# Patient Record
Sex: FEMALE
[De-identification: names, ages, dates, MRNs, and addresses within clinical notes are randomized per-mention and may not be internally consistent; named-entity substitution may affect disease eponyms.]

---

## 2019-08-15 ENCOUNTER — HOSPITAL ENCOUNTER (OUTPATIENT)
Dept: HOSPITAL 5 - TRG | Age: 29
LOS: 1 days | Discharge: HOME | End: 2019-08-16
Attending: OBSTETRICS & GYNECOLOGY
Payer: COMMERCIAL

## 2019-08-15 DIAGNOSIS — Z3A.39: ICD-10-CM

## 2019-08-15 DIAGNOSIS — O47.1: Primary | ICD-10-CM

## 2019-08-15 PROCEDURE — 59025 FETAL NON-STRESS TEST: CPT

## 2019-08-16 VITALS — SYSTOLIC BLOOD PRESSURE: 107 MMHG | DIASTOLIC BLOOD PRESSURE: 69 MMHG

## 2019-08-17 ENCOUNTER — HOSPITAL ENCOUNTER (INPATIENT)
Dept: HOSPITAL 5 - LD | Age: 29
LOS: 4 days | Discharge: HOME | End: 2019-08-21
Attending: OBSTETRICS & GYNECOLOGY | Admitting: OBSTETRICS & GYNECOLOGY
Payer: COMMERCIAL

## 2019-08-17 DIAGNOSIS — Z3A.39: ICD-10-CM

## 2019-08-17 DIAGNOSIS — D50.9: ICD-10-CM

## 2019-08-17 DIAGNOSIS — D62: ICD-10-CM

## 2019-08-17 LAB
HCT VFR BLD CALC: 33.4 % (ref 30.3–42.9)
HGB BLD-MCNC: 10.6 GM/DL (ref 10.1–14.3)
MCHC RBC AUTO-ENTMCNC: 32 % (ref 30–34)
MCV RBC AUTO: 72 FL (ref 79–97)
PLATELET # BLD: 274 K/MM3 (ref 140–440)
RBC # BLD AUTO: 4.65 M/MM3 (ref 3.65–5.03)

## 2019-08-17 PROCEDURE — 0HQ9XZZ REPAIR PERINEUM SKIN, EXTERNAL APPROACH: ICD-10-PCS | Performed by: OBSTETRICS & GYNECOLOGY

## 2019-08-17 PROCEDURE — 36415 COLL VENOUS BLD VENIPUNCTURE: CPT

## 2019-08-17 PROCEDURE — 86900 BLOOD TYPING SEROLOGIC ABO: CPT

## 2019-08-17 PROCEDURE — 85014 HEMATOCRIT: CPT

## 2019-08-17 PROCEDURE — 96367 TX/PROPH/DG ADDL SEQ IV INF: CPT

## 2019-08-17 PROCEDURE — 96360 HYDRATION IV INFUSION INIT: CPT

## 2019-08-17 PROCEDURE — 76857 US EXAM PELVIC LIMITED: CPT

## 2019-08-17 PROCEDURE — 88307 TISSUE EXAM BY PATHOLOGIST: CPT

## 2019-08-17 PROCEDURE — 86850 RBC ANTIBODY SCREEN: CPT

## 2019-08-17 PROCEDURE — 85018 HEMOGLOBIN: CPT

## 2019-08-17 PROCEDURE — 86901 BLOOD TYPING SEROLOGIC RH(D): CPT

## 2019-08-17 PROCEDURE — 85027 COMPLETE CBC AUTOMATED: CPT

## 2019-08-17 PROCEDURE — 86592 SYPHILIS TEST NON-TREP QUAL: CPT

## 2019-08-17 RX ADMIN — FERROUS SULFATE TAB 325 MG (65 MG ELEMENTAL FE) SCH MG: 325 (65 FE) TAB at 23:51

## 2019-08-17 RX ADMIN — Medication SCH MLS/HR: at 14:42

## 2019-08-17 RX ADMIN — Medication SCH MLS/HR: at 13:04

## 2019-08-17 RX ADMIN — IBUPROFEN SCH MG: 600 TABLET, FILM COATED ORAL at 23:51

## 2019-08-17 RX ADMIN — IBUPROFEN SCH: 600 TABLET, FILM COATED ORAL at 14:00

## 2019-08-17 RX ADMIN — FERROUS SULFATE TAB 325 MG (65 MG ELEMENTAL FE) SCH MG: 325 (65 FE) TAB at 15:13

## 2019-08-17 RX ADMIN — IBUPROFEN SCH MG: 600 TABLET, FILM COATED ORAL at 18:47

## 2019-08-17 NOTE — PROCEDURE NOTE
OB Delivery Note





- Delivery


Date of Delivery: 19


Surgeon: YAMILE GAINES


Estimated blood loss: other (400 cc)





- Vaginal


Delivery presentation: vertex


Delivery position: OA


Intrapartum events: precipitous labor- <3hr


Delivery induction: none


Delivery monitor: external FHT, external uterine


Route of delivery: 


Delivery placenta: spontaneous


Delivery cord: nuchal cord, 3 umbilical vessels


Episiotomy: none


Delivery laceration: 1st degree


Delivery repair: vicryl


Anesthesia: none


Delivery comments: 


Spontaneous vaginal delivery at 13:01 of liveborn female infant weighing 3409 

grams over intact perineum with apgars of 8/9. Meconium stained amniotic fluid; 

NICU called to delivery due to meconium. Baby placed briefly skin to skin with 

mom while 3 vessel cord was double clamped and cut. Spontaneous cry and 

respirations. Baby taken to radiant warmer and further suctioned. Cord blood 

obtained. Spontaneous delivery of intact placenta and membranes at 13:04. 

Pitocin to IV fluids after delivery of placenta. Uterine atonic; Cytotec 800 mcg

given rectally. Fundal massage performed.  cc. Vaginal sweep negative; 

1st degree perineal laceration repaired with 3-0 vicryl. No other lacerations 

noted. Mother and baby stable.

## 2019-08-17 NOTE — HISTORY AND PHYSICAL REPORT
History of Present Illness


Date of examination: 19


Date of admission: 


19 11:05





Chief complaint: 


Labor





History of present illness: 


29 year old female  presents in active labor at term.


Patient received prenatal care at [facility] and prenatal records are 

available. 


LMP 11/15/18. EDC 19.


Pregnancy significant for the following: anemia.


Prenatal labs are as follows: O+, antibody screen negative, pap smear negative, 

rubella immune, RPR nonreactive, HIV negative, hepatitis B surface antigen 

negative, GC negative, CT negative, MSAFP negative, 1 hour sugar test 92, GBS 

negative.











Past History


Past Medical History: no pertinent history


Past Surgical History: no surgical history


GYN History: denies: abnormal PAP smear, chlamydia, gonorrhea, hepatitis B, 

hepatitis C, herpes, HIV, syphilis, trichomonas


Family/Genetic History: none


Social history: , full code.  denies: smoking, alcohol abuse, 

prescription drug abuse, IV drug use





- Obstetrical History


Expected Date of Delivery: 19


Actual Gestation: 39 Week(s) 2 Day(s) 


: 5


Para: 4


Hx # Term Pregnancies: 4


Number of  Pregnancies: 0


Spontaneous Abortions: 0


Induced : 0


Number of Living Children: 4





Medications and Allergies


                                    Allergies











Allergy/AdvReac Type Severity Reaction Status Date / Time


 


No Known Allergies Allergy   Verified 19 11:19











                                Home Medications











 Medication  Instructions  Recorded  Confirmed  Last Taken  Type


 


Prenatal Vit-Fe Fumar-FA [Prenatal 1 tab PO DAILY 08/15/19 08/15/19 08/15/19 

History





Vitamin]     














Review of Systems


All systems: negative (contractions)





- Physical Exam


Abdomen: Positive: normal appearance, soft.  Negative: distention, tenderness, 

guarding, rigidity


Genitourinary (Female): Positive: normal external genitalia, normal perenium.  

Negative: perineal/vulvar lesions


Vagina: Positive: normal moisture


Uterus: Positive: enlarged.  Negative: tender


Anus/Rectum: Positive: normal perianal skin


Extremities: Positive: normal.  Negative: tenderness, edema





- Obstetrical


FHR: category 1


Uterine Contraction Monitor Mode: External


Cervical Dilatation: 6.5


Cervical Effacement Percentage: 100


Fetal station: -1


Uterine Contraction Pattern: Regular


Uterine Contraction Intensity: Moderate





Results


All other labs normal.








Assessment and Plan


A: Pregnancy at 39 weeks, 2 days gestation. 


Active labor. 


GBS negative. 


P: Admit. 


Continuous EFM.


Anticipate vaginal birth.

## 2019-08-17 NOTE — EVENT NOTE
Date: 08/17/19


US shows possible accessory lobe of placenta retained. Called Dr. Stock re: 

this US finding. He orders to give patient another bag of Pitocin. Orders put in

for Pitocin.

## 2019-08-18 LAB
HCT VFR BLD CALC: 22.6 % (ref 30.3–42.9)
HGB BLD-MCNC: 7.3 GM/DL (ref 10.1–14.3)

## 2019-08-18 RX ADMIN — IBUPROFEN SCH: 600 TABLET, FILM COATED ORAL at 01:00

## 2019-08-18 RX ADMIN — IBUPROFEN SCH MG: 600 TABLET, FILM COATED ORAL at 09:16

## 2019-08-18 RX ADMIN — IBUPROFEN SCH: 600 TABLET, FILM COATED ORAL at 20:30

## 2019-08-18 RX ADMIN — FERROUS SULFATE TAB 325 MG (65 MG ELEMENTAL FE) SCH MG: 325 (65 FE) TAB at 22:30

## 2019-08-18 RX ADMIN — IBUPROFEN SCH MG: 600 TABLET, FILM COATED ORAL at 16:34

## 2019-08-18 RX ADMIN — FERROUS SULFATE TAB 325 MG (65 MG ELEMENTAL FE) SCH MG: 325 (65 FE) TAB at 09:15

## 2019-08-18 RX ADMIN — MISOPROSTOL SCH MCG: 200 TABLET ORAL at 22:30

## 2019-08-18 NOTE — PROGRESS NOTE
Assessment and Plan


A: Postpartum/postop day 1 S/P .


Anemia secondary to pregnancy and blood loss. 


P: Supplement with iron. 


Follow up pelvic ultrasound today as US yesterday showed possible retained 

products (has been ordered). 











Subjective





- Subjective


Date of service: 19


Principal diagnosis: Postpartum day 1 S/P 


Interval history: 


Postpartum day 1 S/P . Doing well. Follow up pelvic US has been ordered.


Patient reports minimal lochia, and she denies clots. Few afterbirth cramps. 


Voiding without difficulty, ambulating well, passing gas, tolerating a regular 

diet without nausea or vomiting.


Patient denies headache, chest pain, cough, shortness of breath, dizziness, leg 

pain, or heavy bleeding. 





Patient reports: appetite normal, pain well controlled, flatus, ambulating 

normally, no voiding normally, no dizzy ambulation, no nauseated


[place]: doing well





Objective





- Vital Signs


Latest vital signs: 


                                   Vital Signs











  Temp Pulse Resp BP BP Pulse Ox


 


 19 16:34    20   


 


 19 12:36  98.0 F   18  102/57  


 


 19 09:20  98.1 F  90  18  100/55   96


 


 19 09:16    20   


 


 19 23:30  98.6 F  74  18   112/67 


 


 19 18:47    20   


 


 19 17:30  99.4 F  80  20   104/44 








                                Intake and Output











 19





 07:59 15:59 23:59


 


Intake Total 200  


 


Balance 200  


 


Intake:   


 


  Oral 200  


 


Other:   


 


  Total, Intake Amount 200  














- Exam


Cardiovascular: Present: Regular rate, Normal S1, Normal S2


Lungs: Present: Clear to auscultation


Abdomen: Present: normal appearance, soft, normal bowel sounds.  Absent: 

distention, tenderness, guarding, rigidity


Uterus: Present: normal, firm, fundal height below umbilicus.  Absent: 

bogginess, tenderness


Extremities: Present: normal.  Absent: tenderness, edema





- Labs


Labs: 


                              Abnormal lab results











  19 Range/Units





  15:44 


 


Hgb  7.3 L D  (10.1-14.3)  gm/dl


 


Hct  22.6 L D  (30.3-42.9)  %

## 2019-08-18 NOTE — EVENT NOTE
Date: 08/18/19


Patient has a minimal amount of lochia. Not bleeding heavily and not passing 

clots. US follow up exam was similar to yesterday's US. Consulted with Dr. Stock re: US results. Dr. Stock orders to give patient Cytotec. Cytotec 200 

mcg po every 8 hours ordered for patient.

## 2019-08-19 RX ADMIN — FERROUS SULFATE TAB 325 MG (65 MG ELEMENTAL FE) SCH MG: 325 (65 FE) TAB at 23:54

## 2019-08-19 RX ADMIN — MISOPROSTOL SCH MCG: 200 TABLET ORAL at 06:58

## 2019-08-19 RX ADMIN — IBUPROFEN SCH MG: 600 TABLET, FILM COATED ORAL at 08:40

## 2019-08-19 RX ADMIN — IBUPROFEN SCH MG: 600 TABLET, FILM COATED ORAL at 14:00

## 2019-08-19 RX ADMIN — FERROUS SULFATE TAB 325 MG (65 MG ELEMENTAL FE) SCH MG: 325 (65 FE) TAB at 08:40

## 2019-08-19 RX ADMIN — MISOPROSTOL SCH MCG: 200 TABLET ORAL at 13:42

## 2019-08-19 RX ADMIN — IBUPROFEN SCH: 600 TABLET, FILM COATED ORAL at 23:56

## 2019-08-19 RX ADMIN — IBUPROFEN SCH MG: 600 TABLET, FILM COATED ORAL at 06:58

## 2019-08-19 NOTE — PROGRESS NOTE
Assessment and Plan





- Patient Problems


(1) Status post normal vaginal delivery


Current Visit: Yes   Status: Acute   


Plan to address problem: 


PPD 2 - stable


Continue routine postpartum orders


Consult MD about plan of care secondary to Retained POC








(2) Retained products of conception after delivery without hemorrhage but with 

other complication


Current Visit: Yes   Status: Acute   


Plan to address problem: 


Confirmed on two ultrasounds


s/p Cytotec


Consult Dr. Norris about plan of care








(3) Anemia due to blood loss, acute


Current Visit: Yes   Status: Acute   


Plan to address problem: 


Asymptomatic


Continue iron therapy








Subjective





- Subjective


Date of service: 19


Principal diagnosis: PPD #2; s/p 


Interval history: 





see H&P, OB Delivery Procedure Note, Event Notes, PP/GYN Progress Note and U/S 

Reports


Patient reports: appetite normal, voiding normally, pain well controlled, 

ambulating normally, no dizzy ambulation


: doing well





Objective





- Vital Signs


Latest vital signs: 


                                   Vital Signs











  Temp Pulse Resp BP BP Pulse Ox


 


 19 08:34  98.7 F  76  18   103/63 


 


 19 06:58    18   


 


 19 00:31  97.9 F  89  16   103/63  98


 


 19 17:18  97.5 F L  84  18  92/55   98


 


 19 16:34    20   


 


 19 12:36  98.0 F   18  102/57  








                                Intake and Output











 19





 23:59 07:59 15:59


 


Intake Total   480


 


Balance   480


 


Intake:   


 


  Oral   480


 


Other:   


 


  Total, Intake Amount   480


 


  # Voids   


 


    Void  2 














- Exam


Abdomen: Present: normal appearance, soft


Vulva: both: laceration/episiotomy (well approximated)


Uterus: Present: normal, firm, fundal height at umbilicus


Extremities: Present: normal


Comments: 





scant lochia





- Labs


Labs: 


                              Abnormal lab results











  19 Range/Units





  15:44 


 


Hgb  7.3 L D  (10.1-14.3)  gm/dl


 


Hct  22.6 L D  (30.3-42.9)  %

## 2019-08-20 LAB
HCT VFR BLD CALC: 22.8 % (ref 30.3–42.9)
HGB BLD-MCNC: 7.3 GM/DL (ref 10.1–14.3)

## 2019-08-20 RX ADMIN — IBUPROFEN SCH MG: 600 TABLET, FILM COATED ORAL at 13:09

## 2019-08-20 RX ADMIN — IBUPROFEN SCH MG: 600 TABLET, FILM COATED ORAL at 17:58

## 2019-08-20 RX ADMIN — FERROUS SULFATE TAB 325 MG (65 MG ELEMENTAL FE) SCH MG: 325 (65 FE) TAB at 10:00

## 2019-08-20 NOTE — PROGRESS NOTE
Assessment and Plan





- Patient Problems


(1) Status post normal vaginal delivery


Current Visit: Yes   Status: Acute   


Plan to address problem: 


Continue routine PP orders


 May d/c home today if CBC stable








(2) Retained products of conception after delivery without hemorrhage but with 

other complication


Current Visit: Yes   Status: Acute   


Plan to address problem: 


Minimal bleeding noted


 Denies pelvic or abdominal pain








(3) Anemia due to blood loss, acute


Current Visit: Yes   Status: Acute   


Plan to address problem: 


Asymptomatic, but pale in color


 Stat CBC


 Continue daily oral iron supplementation








Subjective





- Subjective


Date of service: 19


Principal diagnosis: PPD #3; s/p 


Interval history: 





See admission H & P, OB delivery summary, U/S reports and PP progress reports


Patient reports: appetite normal, voiding normally, pain well controlled (with 

medications), flatus, ambulating normally


: doing well, bottle feeding (and breastfeeding)





Objective





- Vital Signs


Latest vital signs: 


                                   Vital Signs











  Temp Pulse Resp BP BP Pulse Ox


 


 19 01:36  97.7 F  67  18  96/54   99


 


 19 16:36  98.9 F  82  18   107/45 








                                Intake and Output











 19





 23:59 07:59 15:59


 


Intake Total 600  


 


Balance 600  


 


Intake:   


 


  Oral 600  


 


Other:   


 


  Total, Intake Amount 120  














- Exam


Breasts: Present: normal


Cardiovascular: Present: Regular rate


Lungs: Present: Normal air movement


Abdomen: Present: soft, normal bowel sounds


Uterus: Present: firm, fundal height below umbilicus (U-1)


Extremities: Present: normal


Deep Tendon Reflex Grade: Normal +2


Incision: Present: other (first degree laceration healing as expected)

## 2019-08-21 VITALS — DIASTOLIC BLOOD PRESSURE: 67 MMHG | SYSTOLIC BLOOD PRESSURE: 106 MMHG

## 2019-08-21 LAB
HCT VFR BLD CALC: 23.4 % (ref 30.3–42.9)
HGB BLD-MCNC: 7.3 GM/DL (ref 10.1–14.3)

## 2019-08-21 RX ADMIN — IBUPROFEN SCH: 600 TABLET, FILM COATED ORAL at 02:31

## 2019-08-21 RX ADMIN — IBUPROFEN SCH: 600 TABLET, FILM COATED ORAL at 06:46

## 2019-08-21 RX ADMIN — IBUPROFEN SCH: 600 TABLET, FILM COATED ORAL at 08:48

## 2019-08-21 RX ADMIN — FERROUS SULFATE TAB 325 MG (65 MG ELEMENTAL FE) SCH MG: 325 (65 FE) TAB at 08:46

## 2019-08-21 NOTE — PROGRESS NOTE
Assessment and Plan





- Patient Problems


(1) Status post normal vaginal delivery


Current Visit: Yes   Status: Acute   





(2) Retained products of conception after delivery without hemorrhage but with 

other complication


Current Visit: Yes   Status: Acute   


Plan to address problem: 


I got Ayanna from [facility] office to translate for me. Patient was told about 

the structure inside the uterus which may be blood clots or retained piece of 

placenta. I told her that since she is not bleeding and is asymptomatic, I will 

not do anything at this time but she will need a f/u sonogram in 2 weeks to marisol

ssess the endometrium. I am discharging her home with precautions to return to 

the hospital if any heavy bleeding.


She expressed understanding of such plan. The phone number for [facility] was 

given to her to call for sonogram appointment.








(3) Anemia


Current Visit: Yes   Status: Acute   


Qualifiers: 


   Anemia type: iron deficiency 





Subjective





- Subjective


Date of service: 19


Principal diagnosis: PPD #3; s/p 


Interval history: 





Patient is S/P  on 19. She lost about 700 cc of blood secondary to 

uterine atony. She had pitocin and cytotec. After the delivery, sonogram was 

done and showed a ill-defined soft tissue mass in the endometrium measuring 9 x 

4 cm. Since then, the patient has only had mild lochia and minimal post partum 

cramp. Repeat sonogram the following day showed the same finding while the 

patient had no more bleeding.


I just spoke with the patient and she denies any bleeding, pain, dizziness or 

palpitation. Her vitals are stable. Her H/H decreased from 10/33 to 7/23. She is

on iron. 








Objective





- Vital Signs


Latest vital signs: 


                                   Vital Signs











  Temp Pulse Resp BP BP Pulse Ox


 


 19 15:31  98.7 F  66  18  106/67   99


 


 19 08:21  98.2 F  71  16  100/58   99


 


 19 01:20  97.9 F  84  18  86/56   97


 


 19 23:45  97.9 F  83  18   86/56  97








                                Intake and Output











 19





 07:59 15:59 23:59


 


Intake Total  1080 120


 


Balance  1080 120


 


Intake:   


 


  Oral  480 120


 


  Intake, Free Water  600 


 


Other:   


 


  Total, Intake Amount  240 120


 


  # Voids   


 


    Void  1 














- Exam


Cardiovascular: Present: Normal S1, Normal S2


Lungs: Present: Clear to auscultation


Vulva: both: normal





- Labs


Labs: 


                              Abnormal lab results











  19 Range/Units





  05:59 


 


Hgb  7.3 L  (10.1-14.3)  gm/dl


 


Hct  23.4 L  (30.3-42.9)  %

## 2021-02-18 ENCOUNTER — HOSPITAL ENCOUNTER (OUTPATIENT)
Dept: HOSPITAL 5 - TRG | Age: 31
Discharge: HOME | End: 2021-02-18
Attending: OBSTETRICS & GYNECOLOGY
Payer: COMMERCIAL

## 2021-02-18 VITALS — SYSTOLIC BLOOD PRESSURE: 108 MMHG | DIASTOLIC BLOOD PRESSURE: 70 MMHG

## 2021-02-18 DIAGNOSIS — Z3A.34: ICD-10-CM

## 2021-02-18 DIAGNOSIS — O47.03: Primary | ICD-10-CM

## 2021-02-18 PROCEDURE — 84112 EVAL AMNIOTIC FLUID PROTEIN: CPT

## 2021-02-18 PROCEDURE — 36415 COLL VENOUS BLD VENIPUNCTURE: CPT

## 2021-02-18 PROCEDURE — 59025 FETAL NON-STRESS TEST: CPT

## 2021-02-19 ENCOUNTER — HOSPITAL ENCOUNTER (INPATIENT)
Dept: HOSPITAL 5 - TRG | Age: 31
LOS: 2 days | Discharge: HOME | End: 2021-02-21
Attending: OBSTETRICS & GYNECOLOGY | Admitting: OBSTETRICS & GYNECOLOGY
Payer: COMMERCIAL

## 2021-02-19 DIAGNOSIS — Z20.822: ICD-10-CM

## 2021-02-19 DIAGNOSIS — Z3A.40: ICD-10-CM

## 2021-02-19 LAB
HCT VFR BLD CALC: 36.5 % (ref 30.3–42.9)
HGB BLD-MCNC: 11.5 GM/DL (ref 10.1–14.3)
MCHC RBC AUTO-ENTMCNC: 32 % (ref 30–34)
MCV RBC AUTO: 76 FL (ref 79–97)
PLATELET # BLD: 265 K/MM3 (ref 140–440)
RBC # BLD AUTO: 4.78 M/MM3 (ref 3.65–5.03)

## 2021-02-19 PROCEDURE — 86850 RBC ANTIBODY SCREEN: CPT

## 2021-02-19 PROCEDURE — 86900 BLOOD TYPING SEROLOGIC ABO: CPT

## 2021-02-19 PROCEDURE — 86592 SYPHILIS TEST NON-TREP QUAL: CPT

## 2021-02-19 PROCEDURE — 85027 COMPLETE CBC AUTOMATED: CPT

## 2021-02-19 PROCEDURE — 36415 COLL VENOUS BLD VENIPUNCTURE: CPT

## 2021-02-19 PROCEDURE — 10907ZC DRAINAGE OF AMNIOTIC FLUID, THERAPEUTIC FROM PRODUCTS OF CONCEPTION, VIA NATURAL OR ARTIFICIAL OPENING: ICD-10-PCS | Performed by: OBSTETRICS & GYNECOLOGY

## 2021-02-19 PROCEDURE — 86901 BLOOD TYPING SEROLOGIC RH(D): CPT

## 2021-02-19 PROCEDURE — U0003 INFECTIOUS AGENT DETECTION BY NUCLEIC ACID (DNA OR RNA); SEVERE ACUTE RESPIRATORY SYNDROME CORONAVIRUS 2 (SARS-COV-2) (CORONAVIRUS DISEASE [COVID-19]), AMPLIFIED PROBE TECHNIQUE, MAKING USE OF HIGH THROUGHPUT TECHNOLOGIES AS DESCRIBED BY CMS-2020-01-R: HCPCS

## 2021-02-19 PROCEDURE — 0HQ9XZZ REPAIR PERINEUM SKIN, EXTERNAL APPROACH: ICD-10-PCS | Performed by: OBSTETRICS & GYNECOLOGY

## 2021-02-19 RX ADMIN — IBUPROFEN SCH MG: 600 TABLET, FILM COATED ORAL at 17:41

## 2021-02-19 RX ADMIN — IBUPROFEN SCH MG: 600 TABLET, FILM COATED ORAL at 23:53

## 2021-02-19 NOTE — PROCEDURE NOTE
OB Delivery Note





- Delivery


Date of Delivery: 21


Surgeon: HARRY BELLE


Estimated blood loss: other (350cc)





- Vaginal


Delivery presentation: vertex (infant rotated to OA when delivered)


Delivery position: OP


Intrapartum events: none


Delivery induction: none


Delivery monitor: external FHT


Route of delivery: 


Delivery placenta: spontaneous


Delivery cord: nuchal cord (x1 cut inbetween on the perineum)


Episiotomy: none


Delivery laceration: 1st degree (repaired with lidocaine 2% and 2-0 chromic 

figure of 8 x2 sutures)


Delivery repair: chromic


Anesthesia: local


Delivery comments: 


Precipitous vaginal delivery uncomplicated with complete placenta with 3vessel 

cord.  Bimanual massage done with moderately firm uterus and cytotec 800mcg 

placed per rectum.  Perineal laceration 1st degree repaired with 2-0 chromic 

with excellent hemostasis. No lacerations noted to cervix when same was 

visualized using speculum.  Mom and baby stable.  





- Infant


  ** A


Apgar at 1 minute: 8


Apgar at 5 minutes: 9 (Wt 3638g; Time of delivery was 10:41am)

## 2021-02-19 NOTE — HISTORY AND PHYSICAL REPORT
History of Present Illness


Date of examination: 21


Date of admission: 


21 09:49





Chief complaint: 


contractions


History of present illness: 


 at 40wks by prenatal records from [facility], [facility] 21.  Pt 

admits to fetal movement, denies leakage of fluid or vaginal bleeding and c/o 

painful contractions.  Denies headache





Past History


Past Medical History: no pertinent history


Past Surgical History: no surgical history


Family/Genetic History: none


Social history: no significant social history





- Obstetrical History


: 6


Para: 5


Number of Living Children: 5





Medications and Allergies


                                    Allergies











Allergy/AdvReac Type Severity Reaction Status Date / Time


 


No Known Allergies Allergy   Unverified 21 11:37











                                Home Medications











 Medication  Instructions  Recorded  Confirmed  Last Taken  Type


 


Prenatal Multivitamin Tablet 1 tab PO DAILY 21 10:00 

History











Active Meds: 


Active Medications





Butorphanol Tartrate (Butorphanol 2 Mg/1 Ml Inj)  2 mg IV Q2H PRN


   PRN Reason: Pain , Severe (7-10)


Ephedrine Sulfate (Ephedrine Sulfate 50 Mg/1 Ml Inj)  10 mg IV Q2M PRN


   PRN Reason: Hypotension


Fentanyl (Fentanyl 100 Mcg/2 Ml Inj)  100 mcg IV Q2H PRN


   PRN Reason: Pain,Severe (7-10) LABOR PAIN


Oxytocin/Sodium Chloride (Pitocin/Ns 30 Unit/500ml)  30 units in 500 mls @ 2 

mls/hr IV TITR ARLETH; Protocol


Lactated Ringer's (Lactated Ringers)  1,000 mls @ 125 mls/hr IV AS DIRECT ARLETH


Oxytocin/Sodium Chloride (Pitocin/Ns 30 Unit/500ml)  30 units in 500 mls @ 40 

mls/hr IV TITR ARLETH; Protocol


Lidocaine (Lidocaine (2%) 20 Mg/1 Ml Vial 20 Ml Mdv)  20 ml INFILTRATI ONCE NR


   Stop: 21 13:00


   Last Admin: 21 10:49 Dose:  20 ml


   Documented by: 


Mineral Oil (Mineral Oil 30 Ml Oral Liqd)  30 ml PO QHS PRN


   PRN Reason: Constipation


Misoprostol (Misoprostol 200 Mcg Tab)  800 mcg [place] ONCE ARLETH


   Stop: 21 12:30


Terbutaline Sulfate (Terbutaline 1 Mg/1 Ml Inj)  0.25 mg SUB-Q ONCE PRN


   PRN Reason: Hyperstimulation/Hypertonicity


   Stop: 21 08:59











Review of Systems


All systems: negative (painful contractions with the desire to push)





- Vital Signs


Vital signs: 


                                   Vital Signs











Pulse BP


 


 73   115/74 


 


 21 08:13  21 08:13








                                        











Temp Pulse Resp BP Pulse Ox


 


 98.4 F   82   16   115/74   99 


 


 21 08:48  21 09:53  21 08:48  21 08:48  21 09:53














- Physical Exam


Cardiovascular: Regular rate


Lungs: Positive: Normal air movement


Abdomen: Positive: normal appearance


Genitourinary (Female): Positive: normal external genitalia


Vulva: both: normal


Vagina: Positive: normal moisture


Uterus: Positive: other (non-tender gravid uterus)


Extremities: Positive: normal





- Obstetrical


FHR: category 1


Uterine Contraction Monitor Mode: External


Cervical Dilatation: 7 (per triage nurse on admit)


Cervical Effacement Percentage: 100 (per triage nurse)


Fetal station: -1 by me on exam


Uterine Contraction Pattern: Regular


Uterine Contraction Intensity: Moderate





Results


Result Diagrams: 


                                21 Unknown





                              Abnormal lab results











  21 Range/Units





  Unknown 


 


MCV  76 L  (79-97)  fl


 


MCH  24 L  (28-32)  pg


 


RDW  18.0 H  (13.2-15.2)  %








All other labs normal.








Assessment and Plan


Grand multiparous in active labor


1. Admit to labor and delivery, expectant mgt given and pt now 9cm; Plan of care

discussed using pt's significant other at bedside with pt pushing and bearing 

down ready to deliver. 


2. AROM clear fluid after IV access restarted to right forearm with the other IV

access out 


3. Plan for delivery.  Expect 


All questions encouraged and answered

## 2021-02-20 LAB
HCT VFR BLD CALC: 28.6 % (ref 30.3–42.9)
HGB BLD-MCNC: 9.4 GM/DL (ref 10.1–14.3)
MCHC RBC AUTO-ENTMCNC: 33 % (ref 30–34)
MCV RBC AUTO: 77 FL (ref 79–97)
PLATELET # BLD: 227 K/MM3 (ref 140–440)
RBC # BLD AUTO: 3.71 M/MM3 (ref 3.65–5.03)

## 2021-02-20 RX ADMIN — IBUPROFEN SCH MG: 600 TABLET, FILM COATED ORAL at 05:49

## 2021-02-20 RX ADMIN — FERROUS SULFATE TAB 325 MG (65 MG ELEMENTAL FE) SCH MG: 325 (65 FE) TAB at 21:54

## 2021-02-20 RX ADMIN — IBUPROFEN SCH MG: 600 TABLET, FILM COATED ORAL at 17:25

## 2021-02-20 RX ADMIN — Medication SCH EACH: at 09:13

## 2021-02-20 RX ADMIN — IBUPROFEN SCH MG: 600 TABLET, FILM COATED ORAL at 11:32

## 2021-02-20 RX ADMIN — FERROUS SULFATE TAB 325 MG (65 MG ELEMENTAL FE) SCH MG: 325 (65 FE) TAB at 11:32

## 2021-02-20 NOTE — PROGRESS NOTE
Assessment and Plan


A: Postpartum day 1 S/P .


Anemia.


P: Iron supplementation.


Plan discharge home tomorrow if patient continues to do well. 








Subjective





- Subjective


Date of service: 21


Principal diagnosis: Postpartum day 1 S/P 


Patient reports: appetite normal, voiding normally, pain well controlled, 

flatus, ambulating normally, no dizzy ambulation, no nauseated


: doing well





Objective





- Vital Signs


Latest vital signs: 


                                   Vital Signs











  Temp Pulse Resp BP BP Pulse Ox


 


 21 08:02  97.7 F  67  18  92/58   99


 


 21 23:59  98.1 F  69  18  109/63   97


 


 21 20:09  98.1 F  75  18  99/62   97


 


 21 17:27  98.2 F  80  18  104/66   97


 


 21 16:09  98 F  68  16   106/68  96


 


 21 13:15  98.1 F     


 


 21 13:05   81   104/62  


 


 21 12:50   65   96/59  


 


 21 12:35   71   100/60  


 


 21 12:24   95 H   92/61  


 


 21 12:05  97.9 F     








                                Intake and Output











 21





 23:59 07:59 15:59


 


Intake Total 840 600 240


 


Output Total 300  


 


Balance 540 600 240


 


Intake:   


 


  Oral 480 600 240


 


  Intake, Free Water 360  


 


Output:   


 


  Urine 300  


 


    Void 300  


 


Other:   


 


  Total, Intake Amount 480 120 240


 


  Total, Output Amount 300  


 


  # Voids   


 


    Void  1 1














- Exam


Cardiovascular: Present: Regular rate


Lungs: Present: Clear to auscultation


Abdomen: Present: normal appearance, soft, normal bowel sounds.  Absent: 

distention, tenderness, guarding, rigidity


Uterus: Present: normal, firm, fundal height below umbilicus.  Absent: 

bogginess, tenderness


Extremities: Present: normal.  Absent: tenderness, edema





- Labs


Labs: 


                              Abnormal lab results











  21 Range/Units





  06:10 


 


Hgb  9.4 L  (10.1-14.3)  gm/dl


 


Hct  28.6 L D  (30.3-42.9)  %


 


MCV  77 L  (79-97)  fl


 


MCH  25 L  (28-32)  pg


 


RDW  18.7 H  (13.2-15.2)  %

## 2021-02-21 VITALS — DIASTOLIC BLOOD PRESSURE: 73 MMHG | SYSTOLIC BLOOD PRESSURE: 112 MMHG

## 2021-02-21 RX ADMIN — IBUPROFEN SCH MG: 600 TABLET, FILM COATED ORAL at 11:48

## 2021-02-21 RX ADMIN — IBUPROFEN SCH MG: 600 TABLET, FILM COATED ORAL at 00:03

## 2021-02-21 RX ADMIN — Medication SCH EACH: at 11:49

## 2021-02-21 RX ADMIN — IBUPROFEN SCH MG: 600 TABLET, FILM COATED ORAL at 05:57

## 2021-02-21 RX ADMIN — FERROUS SULFATE TAB 325 MG (65 MG ELEMENTAL FE) SCH MG: 325 (65 FE) TAB at 11:49

## 2021-02-21 NOTE — DISCHARGE SUMMARY
Providers





- Providers


Date of Admission: 


21 09:49





Date of discharge: 21


Attending physician: 


HARRY BELLE





Primary care physician: 


COOPER MORATAYA JR, MD








Hospitalization


Reason for admission: active labor


Delivery: 


Other postpartum procedures: none


Postpartum complications: none


Discharge diagnosis: IUP at term delivered


 baby: female


Pertinent studies: 


Labs





Hospital course: 


Stable postpartum course





Condition at discharge: Good


Disposition: DC-01 TO HOME OR SELFCARE





- Discharge Diagnoses


(1) Term pregnancy delivered


Status: Acute   





(2) Anemia


Status: Acute   





Plan





- Provider Discharge Summary


Activity: routine, no sex for 6 weeks, no heavy lifting 4 weeks, no strenuous 

exercise


Diet: routine


Instructions: routine


Additional instructions: 


Continue taking your prenatal vitamins and iron supplements at home. 


Follow up at [facility]ren[facility] clinic in 2 weeks. 





Call your doctor immediately for:


* Fever > 100.5


* Heavy vaginal bleeding ( >1 pad per hour)


* Severe persistent headache


* Shortness of breath


* Reddened, hot, painful area to leg or breast








- Follow up plan


Follow up: 


COOPER MORATAYA JR, MD [Primary Care Provider] - 14 Days


Forms:  [facility] Discharge Summary

## 2021-02-21 NOTE — PROGRESS NOTE
Assessment and Plan


A: Postpartum day 2 S/P .


Anemia.


P: Discharge patient home today. Discussed with patient postpartum discharge 

instructions and warning signs. Advised patient to continue to take her prenatal

vitamins and iron supplements at home. Advised patient to avoid intercourse, 

lifting, housework. Advised patient to follow up at [facility] 

clinic in 2 weeks. Patient voiced understanding of all instructions. 








Subjective





- Subjective


Date of service: 21


Principal diagnosis: Postpartum day 2 S/P 


Patient reports: appetite normal, voiding normally, pain well controlled, 

flatus, ambulating normally, no dizzy ambulation, no nauseated


[place]: doing well





Objective





- Vital Signs


Latest vital signs: 


                                   Vital Signs











  Temp Pulse Resp BP Pulse Ox


 


 21 12:25  98.1 F  84  20  112/73  99


 


 21 07:46  97.9 F  68  16  98/57  98


 


 21 06:57    18  


 


 21 05:57    18  


 


 21 01:03    18  


 


 21 00:03    18  


 


 21 00:00  98.0 F  70  18  99/64  97


 


 21 16:17  97.8 F  73  18  103/62  98








                                Intake and Output











 21





 23:59 07:59 15:59


 


Intake Total 1200 240 120


 


Balance 1200 240 120


 


Intake:   


 


  Oral 600 240 120


 


  Intake, Free Water 600  


 


Other:   


 


  Total, Intake Amount 240 240 120


 


  # Voids   


 


    Void 1 1 1


 


  # Bowel Movements 1  














- Exam


Cardiovascular: Present: Regular rate


Lungs: Present: Clear to auscultation


Abdomen: Present: normal appearance, soft, normal bowel sounds.  Absent: 

distention, tenderness, guarding, rigidity


Uterus: Present: normal, firm, fundal height below umbilicus.  Absent: 

bogginess, tenderness


Extremities: Present: normal.  Absent: tenderness, edema